# Patient Record
Sex: FEMALE | Race: WHITE | NOT HISPANIC OR LATINO
[De-identification: names, ages, dates, MRNs, and addresses within clinical notes are randomized per-mention and may not be internally consistent; named-entity substitution may affect disease eponyms.]

---

## 2020-10-22 PROBLEM — Z00.00 ENCOUNTER FOR PREVENTIVE HEALTH EXAMINATION: Status: ACTIVE | Noted: 2020-10-22

## 2020-10-26 ENCOUNTER — ASOB RESULT (OUTPATIENT)
Age: 50
End: 2020-10-26

## 2020-10-26 ENCOUNTER — APPOINTMENT (OUTPATIENT)
Dept: OBGYN | Facility: CLINIC | Age: 50
End: 2020-10-26
Payer: COMMERCIAL

## 2020-10-26 VITALS
SYSTOLIC BLOOD PRESSURE: 140 MMHG | BODY MASS INDEX: 36.68 KG/M2 | DIASTOLIC BLOOD PRESSURE: 80 MMHG | WEIGHT: 262 LBS | TEMPERATURE: 97.6 F | HEIGHT: 71 IN

## 2020-10-26 DIAGNOSIS — L73.2 HIDRADENITIS SUPPURATIVA: ICD-10-CM

## 2020-10-26 DIAGNOSIS — N83.8 OTHER NONINFLAMMATORY DISORDERS OF OVARY, FALLOPIAN TUBE AND BROAD LIGAMENT: ICD-10-CM

## 2020-10-26 DIAGNOSIS — Z78.9 OTHER SPECIFIED HEALTH STATUS: ICD-10-CM

## 2020-10-26 DIAGNOSIS — I67.1 CEREBRAL ANEURYSM, NONRUPTURED: ICD-10-CM

## 2020-10-26 PROCEDURE — 99072 ADDL SUPL MATRL&STAF TM PHE: CPT

## 2020-10-26 PROCEDURE — 99203 OFFICE O/P NEW LOW 30 MIN: CPT

## 2020-10-26 PROCEDURE — 76830 TRANSVAGINAL US NON-OB: CPT

## 2020-10-26 RX ORDER — LISINOPRIL 10 MG/1
10 TABLET ORAL
Qty: 30 | Refills: 0 | Status: ACTIVE | COMMUNITY
Start: 2020-10-15

## 2020-10-26 RX ORDER — LORAZEPAM 0.5 MG/1
0.5 TABLET ORAL
Qty: 1 | Refills: 0 | Status: ACTIVE | COMMUNITY
Start: 2020-09-24

## 2020-10-26 RX ORDER — ESZOPICLONE 1 MG/1
1 TABLET, FILM COATED ORAL
Qty: 15 | Refills: 0 | Status: ACTIVE | COMMUNITY
Start: 2020-05-18

## 2020-10-26 RX ORDER — MELOXICAM 15 MG/1
15 TABLET ORAL
Qty: 30 | Refills: 0 | Status: ACTIVE | COMMUNITY
Start: 2020-09-26

## 2020-10-26 RX ORDER — SPIRONOLACTONE 25 MG/1
25 TABLET ORAL TWICE DAILY
Qty: 180 | Refills: 0 | Status: ACTIVE | COMMUNITY
Start: 2020-10-26 | End: 1900-01-01

## 2020-10-26 RX ORDER — HYDROCODONE BITARTRATE AND ACETAMINOPHEN 5; 300 MG/1; MG/1
5-300 TABLET ORAL
Qty: 24 | Refills: 0 | Status: ACTIVE | COMMUNITY
Start: 2020-05-26

## 2020-10-27 PROBLEM — N83.8 OVARIAN MASS, LEFT: Status: RESOLVED | Noted: 2020-10-26 | Resolved: 2020-10-27

## 2020-10-27 PROBLEM — N83.8 OVARIAN MASS, RIGHT: Status: ACTIVE | Noted: 2020-10-27

## 2020-10-27 NOTE — PHYSICAL EXAM
[Appropriately responsive] : appropriately responsive [Alert] : alert [No Acute Distress] : no acute distress [No Lymphadenopathy] : no lymphadenopathy [Soft] : soft [Non-tender] : non-tender [Non-distended] : non-distended [No HSM] : No HSM [No Mass] : no mass [Oriented x3] : oriented x3 [Vulvar Scarring] : vulvar scarring [No Lesions] : no lesions  [Labia Majora] : normal [Labia Minora] : normal [Pink Rugae] : pink rugae [No Bleeding] : There was no active vaginal bleeding [Normal] : normal [Normal Position] : in a normal position [Uterine Adnexae] : normal [FreeTextEntry3] : no masses/nodules [Tenderness] : nontender [Enlarged ___ wks] : not enlarged [Mass ___ cm] : no uterine mass was palpated [FreeTextEntry1] : external thighs - old scarring from lesions - no current lesions- near vulva and thighs [FreeTextEntry5] : no masses/polyps/cmt [FreeTextEntry9] : deferred [FreeTextEntry8] : no pelvic masses - could not palpate the adnexal mass

## 2020-10-27 NOTE — PLAN
[FreeTextEntry1] : Ovarian mass\par needs additional imaging\par will start with PUS\par \par Vulva/thigh scarring\par also in axilla as per patient\par abscesses do also form \par has had for years\par c/w hydradenitis\par will start with treatment - doxy and spironolactone\par consider adding rifampin\par \par annual exam to schedule\par \par answered questions\par \par Viola Phillips MD, PhD\par

## 2020-10-27 NOTE — HISTORY OF PRESENT ILLNESS
[Patient reported mammogram was normal] : Patient reported mammogram was normal [Patient reported PAP Smear was normal] : Patient reported PAP Smear was normal [N] : Patient does not use contraception [Y] : Patient is sexually active [Mammogramdate] : ? 1 year ago [PapSmeardate] : 5 years ago [LMPDate] : 12/15/2019 [PGHxTotal] : 3 [[facility] : [facility] [PGHxPremature] : 0 [PGHxAbortions] : 1 [[facility] : 2 [PGHxABInduced] : 1 [FreeTextEntry1] :  section x 2 - c/b PPH requiring blood transfusion

## 2020-10-27 NOTE — REVIEW OF SYSTEMS
[Arthralgias] : arthralgias [Myalgias] : myalgias [Joint Stiffness] : joint stiffness [Negative] : Heme/Lymph [Fever] : no fever [Fatigue] : no fatigue [Night Sweats] : no night sweats [Recent Weight Gain (___ Lbs)] : no recent weight gain [Eye Discharge] : no eye discharge [Dyspnea] : no dyspnea [Cough] : no cough [Chest Pain] : no chest pain [Palpitations] : no palpitations [Lower Ext Edema] : no lower extremity edema [Abdominal Pain] : no abdominal pain [Constipation] : no constipation [Diarrhea] : diarrhea [Heartburn] : no heartburn [Vomiting] : no vomiting [Nausea] : no nausea [Urgency] : no urgency [Frequency] : no frequency [Incontinence] : no incontinence [Dysuria] : no dysuria [Urethral Discharge] : no urethral discharge [Abn Vaginal bleeding] : no abnormal vaginal bleeding [Pelvic pain] : no pelvic pain [CVA Pain] : no CVA pain [Genital Rash/Irritation] : no genital rash/irritation [Breast Pain] : no breast pain [Breast Lump] : no breast lump [Skin Rash] : no skin rash [Mole Changes] : no mole changes [Headache] : no headache [Dizziness] : no dizziness [Anxiety] : no anxiety [Depression] : no depression

## 2021-07-09 ENCOUNTER — RX RENEWAL (OUTPATIENT)
Age: 51
End: 2021-07-09

## 2021-07-09 RX ORDER — DOXYCYCLINE HYCLATE 100 MG/1
100 TABLET ORAL
Qty: 60 | Refills: 5 | Status: ACTIVE | COMMUNITY
Start: 2020-10-26 | End: 1900-01-01

## 2022-05-26 ENCOUNTER — APPOINTMENT (OUTPATIENT)
Dept: THORACIC SURGERY | Facility: CLINIC | Age: 52
End: 2022-05-26
Payer: COMMERCIAL

## 2022-05-26 VITALS — BODY MASS INDEX: 36.4 KG/M2 | WEIGHT: 260 LBS | HEIGHT: 71 IN

## 2022-05-26 DIAGNOSIS — F17.210 NICOTINE DEPENDENCE, CIGARETTES, UNCOMPLICATED: ICD-10-CM

## 2022-05-26 PROCEDURE — G0296 VISIT TO DETERM LDCT ELIG: CPT

## 2022-05-26 NOTE — ASSESSMENT
[Discussed Risks and Advised to Quit Smoking] : Discussed risks and advised to quit smoking [Discussed Cessation Medication] : cessation medication was discussed [Declined] : Patient has declined cessation intervention [de-identified] : Acknowledged how  difficult it is to quit smoking. Advised that quitting smoking is the most important thing a person can do for their health. Does not wish to quit smoking

## 2022-05-26 NOTE — REASON FOR VISIT
[Home] : at home, [unfilled] , at the time of the visit. [Medical Office: ([facility])___] : at the medical office located in  [Verbal consent obtained from patient] : the patient, [unfilled] [Initial Evaluation] : an initial evaluation visit [Review of Eligibility] : review of eligibility [Low-Dose CT Screening Discussion] : low-dose CT lung cancer screening discussion

## 2022-05-26 NOTE — PLAN
[Smoking Cessation] : smoking cessation [FreeTextEntry1] : Plan:\par \par -Low dose CT chest for lung cancer screening. Ana Motley ordered the low dose CT.      No auth required\par \par -Follow up with patient and her referring provider after her LDCT results have been reviewed by the multidisciplinary clinical team, if needed.\par \par -Encouraged smoking cessation.\par \par Patient declines referral to CTC and CCX\par \par Should I screen? tool utilized. 6 Year risk of lung cancer is   0.6%. Patient wishes to proceed with screening.\par \par Engaged in discussion regarding risks of screening during Covid-19 pandemic and precautions that are being used  to reduce exposure.\par \par Engaged in shared decision making with Ms. PERALTASONYENNY . Answered all questions. She verbalized understanding and agreement. She knows to call back with and questions or concerns.\par

## 2022-05-26 NOTE — HISTORY OF PRESENT ILLNESS
[Current] : current smoker [_____ pack-years] : [unfilled] pack-years [TextBox_13] : Referred by Dr. Ana Motley\par \par LISA KING had telephonic visit for a review of eligibility and discussion of the Low dose CT lung cancer screening program. A telephonic visit occurred due to the patient not having access to a smart phone or a computer for an audio/visual visit.  The following was reviewed and confirmed the patient meets screening eligibility criteria.\par -Age 52 year\par Smoking Status:\par -Current smoker\par -Number of pack(s) per day: 1 PPD\par -Number of years smoked: 31\par -Number of pack years smokin\par \par \par Ms. KING   denies any signs or symptoms of lung cancer including new cough, changing cough, hemoptysis, and unintentional weight loss. \par \par Ms. KING reports covid 19 infection 2021. She denies any personal history of lung cancer. Reports no lung cancer in a 1st degree relative. Reports no lung cancer in a 2nd degree relative. Denies any history of lung disease. Denies any  history of  occupational exposures. Had exposure to 2nd hand smoke.\par

## 2022-07-27 ENCOUNTER — NON-APPOINTMENT (OUTPATIENT)
Age: 52
End: 2022-07-27

## 2023-09-13 ENCOUNTER — NON-APPOINTMENT (OUTPATIENT)
Age: 53
End: 2023-09-13

## 2024-10-29 ENCOUNTER — APPOINTMENT (OUTPATIENT)
Dept: RHEUMATOLOGY | Facility: CLINIC | Age: 54
End: 2024-10-29
Payer: COMMERCIAL

## 2024-10-29 VITALS
HEART RATE: 86 BPM | WEIGHT: 268 LBS | DIASTOLIC BLOOD PRESSURE: 80 MMHG | OXYGEN SATURATION: 98 % | HEIGHT: 71 IN | SYSTOLIC BLOOD PRESSURE: 130 MMHG | BODY MASS INDEX: 37.52 KG/M2

## 2024-10-29 DIAGNOSIS — M19.90 UNSPECIFIED OSTEOARTHRITIS, UNSPECIFIED SITE: ICD-10-CM

## 2024-10-29 PROCEDURE — 99204 OFFICE O/P NEW MOD 45 MIN: CPT

## 2024-10-29 RX ORDER — PREDNISONE 5 MG/1
5 TABLET ORAL
Qty: 60 | Refills: 0 | Status: ACTIVE | COMMUNITY
Start: 2024-10-29 | End: 1900-01-01

## 2024-10-29 RX ORDER — AMLODIPINE BESYLATE 5 MG/1
5 TABLET ORAL
Refills: 0 | Status: ACTIVE | COMMUNITY

## 2024-11-12 ENCOUNTER — RESULT REVIEW (OUTPATIENT)
Age: 54
End: 2024-11-12

## 2024-12-30 ENCOUNTER — APPOINTMENT (OUTPATIENT)
Dept: RHEUMATOLOGY | Facility: CLINIC | Age: 54
End: 2024-12-30

## 2025-02-07 ENCOUNTER — APPOINTMENT (OUTPATIENT)
Dept: THORACIC SURGERY | Facility: CLINIC | Age: 55
End: 2025-02-07
Payer: COMMERCIAL

## 2025-02-07 VITALS — BODY MASS INDEX: 37.66 KG/M2 | HEIGHT: 71 IN | WEIGHT: 269 LBS

## 2025-02-07 DIAGNOSIS — F17.210 NICOTINE DEPENDENCE, CIGARETTES, UNCOMPLICATED: ICD-10-CM

## 2025-02-07 PROCEDURE — ACP01: CPT | Mod: NC

## 2025-04-28 ENCOUNTER — RX RENEWAL (OUTPATIENT)
Age: 55
End: 2025-04-28